# Patient Record
Sex: FEMALE | HISPANIC OR LATINO | Employment: UNEMPLOYED | URBAN - METROPOLITAN AREA
[De-identification: names, ages, dates, MRNs, and addresses within clinical notes are randomized per-mention and may not be internally consistent; named-entity substitution may affect disease eponyms.]

---

## 2020-08-28 ENCOUNTER — TRANSCRIBE ORDERS (OUTPATIENT)
Dept: CARDIOLOGY | Facility: CLINIC | Age: 70
End: 2020-08-28

## 2020-08-28 DIAGNOSIS — R06.09 DOE (DYSPNEA ON EXERTION): Primary | ICD-10-CM

## 2020-09-04 ENCOUNTER — HOSPITAL ENCOUNTER (OUTPATIENT)
Dept: CARDIOLOGY | Facility: HOSPITAL | Age: 70
Discharge: HOME | End: 2020-09-04
Admitting: INTERNAL MEDICINE

## 2020-09-04 VITALS — BODY MASS INDEX: 32 KG/M2 | HEIGHT: 60 IN | WEIGHT: 163 LBS

## 2020-09-04 DIAGNOSIS — R06.09 DOE (DYSPNEA ON EXERTION): ICD-10-CM

## 2020-09-04 PROCEDURE — 93018 CV STRESS TEST I&R ONLY: CPT | Performed by: INTERNAL MEDICINE

## 2020-09-04 PROCEDURE — 93016 CV STRESS TEST SUPVJ ONLY: CPT | Performed by: INTERNAL MEDICINE

## 2020-09-04 PROCEDURE — 78452 HT MUSCLE IMAGE SPECT MULT: CPT

## 2020-09-04 PROCEDURE — 0 TECHNETIUM TETROFOSMIN KIT: Performed by: INTERNAL MEDICINE

## 2020-09-04 PROCEDURE — A9502 TC99M TETROFOSMIN: HCPCS | Performed by: INTERNAL MEDICINE

## 2020-09-04 PROCEDURE — 93017 CV STRESS TEST TRACING ONLY: CPT

## 2020-09-04 PROCEDURE — 25010000002 REGADENOSON 0.4 MG/5ML SOLUTION: Performed by: INTERNAL MEDICINE

## 2020-09-04 PROCEDURE — 78452 HT MUSCLE IMAGE SPECT MULT: CPT | Performed by: INTERNAL MEDICINE

## 2020-09-04 RX ADMIN — TETROFOSMIN 1 DOSE: 1.38 INJECTION, POWDER, LYOPHILIZED, FOR SOLUTION INTRAVENOUS at 11:36

## 2020-09-04 RX ADMIN — REGADENOSON 0.4 MG: 0.08 INJECTION, SOLUTION INTRAVENOUS at 12:23

## 2020-09-04 RX ADMIN — TETROFOSMIN 1 DOSE: 1.38 INJECTION, POWDER, LYOPHILIZED, FOR SOLUTION INTRAVENOUS at 12:23

## 2020-09-08 LAB
BH CV NUCLEAR PRIOR STUDY: 2
BH CV STRESS BP STAGE 1: NORMAL
BH CV STRESS COMMENTS STAGE 1: NORMAL
BH CV STRESS DOSE REGADENOSON STAGE 1: 0.4
BH CV STRESS DURATION MIN STAGE 1: 0
BH CV STRESS DURATION SEC STAGE 1: 10
BH CV STRESS HR STAGE 1: 115
BH CV STRESS PROTOCOL 1: NORMAL
BH CV STRESS RECOVERY BP: NORMAL MMHG
BH CV STRESS RECOVERY HR: 98 BPM
BH CV STRESS STAGE 1: 1
LV EF NUC BP: 81 %
MAXIMAL PREDICTED HEART RATE: 150 BPM
PERCENT MAX PREDICTED HR: 76.67 %
STRESS BASELINE BP: NORMAL MMHG
STRESS BASELINE HR: 86 BPM
STRESS PERCENT HR: 90 %
STRESS POST EXERCISE DUR SEC: 10 SEC
STRESS POST PEAK BP: NORMAL MMHG
STRESS POST PEAK HR: 115 BPM
STRESS TARGET HR: 128 BPM

## 2023-11-17 ENCOUNTER — APPOINTMENT (OUTPATIENT)
Dept: CARDIOLOGY | Facility: HOSPITAL | Age: 73
End: 2023-11-17
Payer: MEDICAID

## 2023-11-17 ENCOUNTER — APPOINTMENT (OUTPATIENT)
Dept: CT IMAGING | Facility: HOSPITAL | Age: 73
End: 2023-11-17
Payer: MEDICAID

## 2023-11-17 ENCOUNTER — HOSPITAL ENCOUNTER (OUTPATIENT)
Facility: HOSPITAL | Age: 73
Setting detail: OBSERVATION
Discharge: HOME | End: 2023-11-17
Attending: EMERGENCY MEDICINE | Admitting: EMERGENCY MEDICINE
Payer: MEDICAID

## 2023-11-17 ENCOUNTER — APPOINTMENT (OUTPATIENT)
Dept: GENERAL RADIOLOGY | Facility: HOSPITAL | Age: 73
End: 2023-11-17
Payer: MEDICAID

## 2023-11-17 VITALS
HEART RATE: 76 BPM | HEIGHT: 59 IN | WEIGHT: 170 LBS | OXYGEN SATURATION: 95 % | BODY MASS INDEX: 34.27 KG/M2 | TEMPERATURE: 97.9 F | DIASTOLIC BLOOD PRESSURE: 83 MMHG | SYSTOLIC BLOOD PRESSURE: 150 MMHG | RESPIRATION RATE: 18 BRPM

## 2023-11-17 DIAGNOSIS — R79.89 TROPONIN LEVEL ELEVATED: ICD-10-CM

## 2023-11-17 DIAGNOSIS — R00.2 PALPITATIONS: Primary | ICD-10-CM

## 2023-11-17 LAB
ALBUMIN SERPL-MCNC: 4.5 G/DL (ref 3.5–5.2)
ALBUMIN/GLOB SERPL: 1.8 G/DL
ALP SERPL-CCNC: 156 U/L (ref 39–117)
ALT SERPL W P-5'-P-CCNC: 19 U/L (ref 1–33)
ANION GAP SERPL CALCULATED.3IONS-SCNC: 9 MMOL/L (ref 5–15)
APTT PPP: 27.1 SECONDS (ref 22.7–35.4)
ASCENDING AORTA: 2.9 CM
AST SERPL-CCNC: 19 U/L (ref 1–32)
BASOPHILS # BLD AUTO: 0.03 10*3/MM3 (ref 0–0.2)
BASOPHILS NFR BLD AUTO: 0.4 % (ref 0–1.5)
BH CV ECHO MEAS - AO MAX PG: 5 MMHG
BH CV ECHO MEAS - AO MEAN PG: 2.48 MMHG
BH CV ECHO MEAS - AO V2 MAX: 111.7 CM/SEC
BH CV ECHO MEAS - AO V2 VTI: 22.3 CM
BH CV ECHO MEAS - AVA(I,D): 2.12 CM2
BH CV ECHO MEAS - EDV(CUBED): 73.4 ML
BH CV ECHO MEAS - EDV(MOD-SP2): 47 ML
BH CV ECHO MEAS - EDV(MOD-SP4): 46 ML
BH CV ECHO MEAS - EF(MOD-BP): 55.6 %
BH CV ECHO MEAS - EF(MOD-SP2): 53.2 %
BH CV ECHO MEAS - EF(MOD-SP4): 58.7 %
BH CV ECHO MEAS - ESV(CUBED): 30.9 ML
BH CV ECHO MEAS - ESV(MOD-SP2): 22 ML
BH CV ECHO MEAS - ESV(MOD-SP4): 19 ML
BH CV ECHO MEAS - FS: 25 %
BH CV ECHO MEAS - IVS/LVPW: 0.86 CM
BH CV ECHO MEAS - IVSD: 0.98 CM
BH CV ECHO MEAS - LAT PEAK E' VEL: 4.9 CM/SEC
BH CV ECHO MEAS - LV DIASTOLIC VOL/BSA (35-75): 26.7 CM2
BH CV ECHO MEAS - LV MASS(C)D: 149.4 GRAMS
BH CV ECHO MEAS - LV MAX PG: 2.7 MMHG
BH CV ECHO MEAS - LV MEAN PG: 1.38 MMHG
BH CV ECHO MEAS - LV SYSTOLIC VOL/BSA (12-30): 11 CM2
BH CV ECHO MEAS - LV V1 MAX: 81.4 CM/SEC
BH CV ECHO MEAS - LV V1 VTI: 15.4 CM
BH CV ECHO MEAS - LVIDD: 4.2 CM
BH CV ECHO MEAS - LVIDS: 3.1 CM
BH CV ECHO MEAS - LVOT AREA: 3.1 CM2
BH CV ECHO MEAS - LVOT DIAM: 1.97 CM
BH CV ECHO MEAS - LVPWD: 1.15 CM
BH CV ECHO MEAS - MED PEAK E' VEL: 5.8 CM/SEC
BH CV ECHO MEAS - MV A DUR: 0.12 SEC
BH CV ECHO MEAS - MV A MAX VEL: 84.8 CM/SEC
BH CV ECHO MEAS - MV DEC SLOPE: 276.8 CM/SEC2
BH CV ECHO MEAS - MV DEC TIME: 0.17 SEC
BH CV ECHO MEAS - MV E MAX VEL: 36.4 CM/SEC
BH CV ECHO MEAS - MV E/A: 0.43
BH CV ECHO MEAS - MV MAX PG: 3.5 MMHG
BH CV ECHO MEAS - MV MEAN PG: 1.29 MMHG
BH CV ECHO MEAS - MV P1/2T: 43.8 MSEC
BH CV ECHO MEAS - MV V2 VTI: 15.6 CM
BH CV ECHO MEAS - MVA(P1/2T): 5 CM2
BH CV ECHO MEAS - MVA(VTI): 3 CM2
BH CV ECHO MEAS - PA ACC TIME: 0.12 SEC
BH CV ECHO MEAS - PA V2 MAX: 74.7 CM/SEC
BH CV ECHO MEAS - RAP SYSTOLE: 3 MMHG
BH CV ECHO MEAS - RV MAX PG: 1.3 MMHG
BH CV ECHO MEAS - RV V1 MAX: 57 CM/SEC
BH CV ECHO MEAS - RV V1 VTI: 10.7 CM
BH CV ECHO MEAS - SI(MOD-SP2): 14.5 ML/M2
BH CV ECHO MEAS - SI(MOD-SP4): 15.7 ML/M2
BH CV ECHO MEAS - SV(LVOT): 47.2 ML
BH CV ECHO MEAS - SV(MOD-SP2): 25 ML
BH CV ECHO MEAS - SV(MOD-SP4): 27 ML
BH CV ECHO MEAS - TAPSE (>1.6): 2.6 CM
BH CV ECHO MEASUREMENTS AVERAGE E/E' RATIO: 6.8
BH CV XLRA - RV BASE: 2.6 CM
BH CV XLRA - RV LENGTH: 6.7 CM
BH CV XLRA - RV MID: 1.8 CM
BH CV XLRA - TDI S': 13.9 CM/SEC
BILIRUB SERPL-MCNC: 0.2 MG/DL (ref 0–1.2)
BILIRUB UR QL STRIP: NEGATIVE
BUN SERPL-MCNC: 17 MG/DL (ref 8–23)
BUN/CREAT SERPL: 14.4 (ref 7–25)
CALCIUM SPEC-SCNC: 9.9 MG/DL (ref 8.6–10.5)
CHLORIDE SERPL-SCNC: 106 MMOL/L (ref 98–107)
CLARITY UR: CLEAR
CO2 SERPL-SCNC: 27 MMOL/L (ref 22–29)
COLOR UR: YELLOW
CREAT SERPL-MCNC: 1.18 MG/DL (ref 0.57–1)
D DIMER PPP FEU-MCNC: 0.82 MCGFEU/ML (ref 0–0.73)
DEPRECATED RDW RBC AUTO: 43.2 FL (ref 37–54)
EGFRCR SERPLBLD CKD-EPI 2021: 48.9 ML/MIN/1.73
EOSINOPHIL # BLD AUTO: 0.11 10*3/MM3 (ref 0–0.4)
EOSINOPHIL NFR BLD AUTO: 1.6 % (ref 0.3–6.2)
ERYTHROCYTE [DISTWIDTH] IN BLOOD BY AUTOMATED COUNT: 13.5 % (ref 12.3–15.4)
GEN 5 2HR TROPONIN T REFLEX: 17 NG/L
GLOBULIN UR ELPH-MCNC: 2.5 GM/DL
GLUCOSE SERPL-MCNC: 109 MG/DL (ref 65–99)
GLUCOSE UR STRIP-MCNC: NEGATIVE MG/DL
HCT VFR BLD AUTO: 44.3 % (ref 34–46.6)
HGB BLD-MCNC: 14.7 G/DL (ref 12–15.9)
HGB UR QL STRIP.AUTO: NEGATIVE
IMM GRANULOCYTES # BLD AUTO: 0.02 10*3/MM3 (ref 0–0.05)
IMM GRANULOCYTES NFR BLD AUTO: 0.3 % (ref 0–0.5)
INR PPP: 1 (ref 0.9–1.1)
KETONES UR QL STRIP: NEGATIVE
LEUKOCYTE ESTERASE UR QL STRIP.AUTO: NEGATIVE
LYMPHOCYTES # BLD AUTO: 1.94 10*3/MM3 (ref 0.7–3.1)
LYMPHOCYTES NFR BLD AUTO: 28 % (ref 19.6–45.3)
MAGNESIUM SERPL-MCNC: 2.2 MG/DL (ref 1.6–2.4)
MCH RBC QN AUTO: 28.8 PG (ref 26.6–33)
MCHC RBC AUTO-ENTMCNC: 33.2 G/DL (ref 31.5–35.7)
MCV RBC AUTO: 86.9 FL (ref 79–97)
MONOCYTES # BLD AUTO: 0.73 10*3/MM3 (ref 0.1–0.9)
MONOCYTES NFR BLD AUTO: 10.5 % (ref 5–12)
NEUTROPHILS NFR BLD AUTO: 4.09 10*3/MM3 (ref 1.7–7)
NEUTROPHILS NFR BLD AUTO: 59.2 % (ref 42.7–76)
NITRITE UR QL STRIP: NEGATIVE
NRBC BLD AUTO-RTO: 0 /100 WBC (ref 0–0.2)
NT-PROBNP SERPL-MCNC: 42.5 PG/ML (ref 0–900)
PH UR STRIP.AUTO: 7.5 [PH] (ref 5–8)
PLATELET # BLD AUTO: 192 10*3/MM3 (ref 140–450)
PMV BLD AUTO: 9.9 FL (ref 6–12)
POTASSIUM SERPL-SCNC: 4 MMOL/L (ref 3.5–5.2)
PROT SERPL-MCNC: 7 G/DL (ref 6–8.5)
PROT UR QL STRIP: NEGATIVE
PROTHROMBIN TIME: 13.3 SECONDS (ref 11.7–14.2)
QT INTERVAL: 374 MS
QTC INTERVAL: 458 MS
RBC # BLD AUTO: 5.1 10*6/MM3 (ref 3.77–5.28)
SINUS: 3 CM
SODIUM SERPL-SCNC: 142 MMOL/L (ref 136–145)
SP GR UR STRIP: 1.01 (ref 1–1.03)
STJ: 2.34 CM
T3FREE SERPL-MCNC: 3.1 PG/ML (ref 2–4.4)
T4 FREE SERPL-MCNC: 0.98 NG/DL (ref 0.93–1.7)
TROPONIN T DELTA: 6 NG/L
TROPONIN T SERPL HS-MCNC: 11 NG/L
TSH SERPL DL<=0.05 MIU/L-ACNC: 4.38 UIU/ML (ref 0.27–4.2)
UROBILINOGEN UR QL STRIP: NORMAL
WBC NRBC COR # BLD AUTO: 6.92 10*3/MM3 (ref 3.4–10.8)

## 2023-11-17 PROCEDURE — 85379 FIBRIN DEGRADATION QUANT: CPT | Performed by: EMERGENCY MEDICINE

## 2023-11-17 PROCEDURE — 25510000001 IOPAMIDOL PER 1 ML: Performed by: EMERGENCY MEDICINE

## 2023-11-17 PROCEDURE — 93306 TTE W/DOPPLER COMPLETE: CPT

## 2023-11-17 PROCEDURE — 83880 ASSAY OF NATRIURETIC PEPTIDE: CPT | Performed by: NURSE PRACTITIONER

## 2023-11-17 PROCEDURE — 84439 ASSAY OF FREE THYROXINE: CPT | Performed by: NURSE PRACTITIONER

## 2023-11-17 PROCEDURE — G0378 HOSPITAL OBSERVATION PER HR: HCPCS

## 2023-11-17 PROCEDURE — 93306 TTE W/DOPPLER COMPLETE: CPT | Performed by: INTERNAL MEDICINE

## 2023-11-17 PROCEDURE — 85025 COMPLETE CBC W/AUTO DIFF WBC: CPT | Performed by: EMERGENCY MEDICINE

## 2023-11-17 PROCEDURE — 93005 ELECTROCARDIOGRAM TRACING: CPT | Performed by: EMERGENCY MEDICINE

## 2023-11-17 PROCEDURE — 96360 HYDRATION IV INFUSION INIT: CPT

## 2023-11-17 PROCEDURE — 81003 URINALYSIS AUTO W/O SCOPE: CPT | Performed by: EMERGENCY MEDICINE

## 2023-11-17 PROCEDURE — 36415 COLL VENOUS BLD VENIPUNCTURE: CPT

## 2023-11-17 PROCEDURE — 99285 EMERGENCY DEPT VISIT HI MDM: CPT

## 2023-11-17 PROCEDURE — 93242 EXT ECG>48HR<7D RECORDING: CPT

## 2023-11-17 PROCEDURE — 99253 IP/OBS CNSLTJ NEW/EST LOW 45: CPT | Performed by: INTERNAL MEDICINE

## 2023-11-17 PROCEDURE — 80053 COMPREHEN METABOLIC PANEL: CPT | Performed by: EMERGENCY MEDICINE

## 2023-11-17 PROCEDURE — 93010 ELECTROCARDIOGRAM REPORT: CPT | Performed by: INTERNAL MEDICINE

## 2023-11-17 PROCEDURE — 84443 ASSAY THYROID STIM HORMONE: CPT | Performed by: NURSE PRACTITIONER

## 2023-11-17 PROCEDURE — 85730 THROMBOPLASTIN TIME PARTIAL: CPT | Performed by: EMERGENCY MEDICINE

## 2023-11-17 PROCEDURE — 84484 ASSAY OF TROPONIN QUANT: CPT | Performed by: EMERGENCY MEDICINE

## 2023-11-17 PROCEDURE — 25810000003 SODIUM CHLORIDE 0.9 % SOLUTION: Performed by: NURSE PRACTITIONER

## 2023-11-17 PROCEDURE — 84481 FREE ASSAY (FT-3): CPT | Performed by: NURSE PRACTITIONER

## 2023-11-17 PROCEDURE — 83735 ASSAY OF MAGNESIUM: CPT | Performed by: EMERGENCY MEDICINE

## 2023-11-17 PROCEDURE — 85610 PROTHROMBIN TIME: CPT | Performed by: EMERGENCY MEDICINE

## 2023-11-17 PROCEDURE — 71045 X-RAY EXAM CHEST 1 VIEW: CPT

## 2023-11-17 PROCEDURE — 71275 CT ANGIOGRAPHY CHEST: CPT

## 2023-11-17 PROCEDURE — 25810000003 SODIUM CHLORIDE 0.9 % SOLUTION: Performed by: EMERGENCY MEDICINE

## 2023-11-17 RX ORDER — LINACLOTIDE 145 UG/1
1 CAPSULE, GELATIN COATED ORAL DAILY
COMMUNITY
Start: 2023-09-04

## 2023-11-17 RX ORDER — ASPIRIN 81 MG/1
324 TABLET, CHEWABLE ORAL ONCE
Status: DISCONTINUED | OUTPATIENT
Start: 2023-11-17 | End: 2023-11-17 | Stop reason: HOSPADM

## 2023-11-17 RX ORDER — DONEPEZIL HYDROCHLORIDE 10 MG/1
10 TABLET, FILM COATED ORAL NIGHTLY
COMMUNITY

## 2023-11-17 RX ORDER — SODIUM CHLORIDE 0.9 % (FLUSH) 0.9 %
10 SYRINGE (ML) INJECTION AS NEEDED
Status: DISCONTINUED | OUTPATIENT
Start: 2023-11-17 | End: 2023-11-17 | Stop reason: HOSPADM

## 2023-11-17 RX ORDER — SODIUM CHLORIDE 0.9 % (FLUSH) 0.9 %
10 SYRINGE (ML) INJECTION EVERY 12 HOURS SCHEDULED
Status: DISCONTINUED | OUTPATIENT
Start: 2023-11-17 | End: 2023-11-17 | Stop reason: HOSPADM

## 2023-11-17 RX ORDER — SODIUM CHLORIDE 9 MG/ML
40 INJECTION, SOLUTION INTRAVENOUS AS NEEDED
Status: DISCONTINUED | OUTPATIENT
Start: 2023-11-17 | End: 2023-11-17 | Stop reason: HOSPADM

## 2023-11-17 RX ADMIN — Medication 10 ML: at 08:10

## 2023-11-17 RX ADMIN — SODIUM CHLORIDE 500 ML: 9 INJECTION, SOLUTION INTRAVENOUS at 05:31

## 2023-11-17 RX ADMIN — SODIUM CHLORIDE 500 ML: 9 INJECTION, SOLUTION INTRAVENOUS at 08:22

## 2023-11-17 RX ADMIN — IOPAMIDOL 69 ML: 755 INJECTION, SOLUTION INTRAVENOUS at 06:29

## 2023-11-17 NOTE — PLAN OF CARE
Goal Outcome Evaluation:      Discharge    Patient  used for discharge. Pt has all belongings and voiced understanding of discharge, follow up and ziopatch.

## 2023-11-17 NOTE — ED TRIAGE NOTES
"Pt arrived via ambulance from home stating she woke up needing to urinate and felt her heart racing with a \"pressure-like\" feeling in her chest. Pt states she has had increased urinary frequency for the last few nights as well.   "

## 2023-11-17 NOTE — PROGRESS NOTES
Clinical Pharmacy Services: Medication History    Kassy Ventura is a 73 y.o. female presenting to [facility] for   Chief Complaint   Patient presents with    Urinary Frequency    Rapid Heart Rate       She  has a past medical history of Hypertension.    Allergies as of 11/17/2023 - Reviewed 11/17/2023   Allergen Reaction Noted    Penicillins Rash 12/18/2020       Medication information was obtained from: Patient   Pharmacy and Phone Number:     Prior to Admission Medications       Prescriptions Last Dose Informant Patient Reported? Taking?    fluticasone (FLONASE) 50 MCG/ACT nasal spray  Self Yes Yes    2 sprays into the nostril(s) as directed by provider Daily.    Linzess 145 MCG capsule capsule   Yes Yes    Take 1 capsule by mouth Daily.    omeprazole (priLOSEC) 20 MG capsule  Pharmacy Yes Yes    Take 1 capsule by mouth every night at bedtime.    oxybutynin XL (DITROPAN-XL) 5 MG 24 hr tablet  Pharmacy Yes Yes    Take 1 tablet by mouth Daily.    SUMAtriptan (IMITREX) 25 MG tablet  Pharmacy Yes Yes    Take 1 tablet by mouth 1 (One) Time As Needed.    traZODone (DESYREL) 50 MG tablet  Pharmacy Yes Yes    Take 1 tablet by mouth At Night As Needed for Sleep.    verapamil (CALAN) 80 MG tablet  Pharmacy Yes Yes    Take 1 tablet by mouth Every 12 (Twelve) Hours.    donepezil (ARICEPT) 10 MG tablet Not Taking Pharmacy Yes No    Take 1 tablet by mouth Every Night.    Patient not taking:  Reported on 11/17/2023    meclizine (ANTIVERT) 25 MG tablet   Yes No    Take 1 tablet by mouth.    methocarbamol (ROBAXIN) 750 MG tablet   Yes No    Take 1 tablet by mouth 3 times a day.    montelukast (SINGULAIR) 10 MG tablet   Yes No    Take 1 tablet by mouth every night at bedtime.    mupirocin (BACTROBAN) 2 % ointment   No No    Apply 1 application  topically to the appropriate area as directed 3 (Three) Times a Day.              Medication notes: Surescripts show the patient was dispensed a prescription for donepezil  on 8/21/23 for a 90 day supply. Patient states she is no longer taking it.     This medication list is complete to the best of my knowledge as of 11/17/2023    Please call if questions.    Dusty Weaver  Medication History Technician  [phone]    11/17/2023 08:37 EST

## 2023-11-17 NOTE — H&P
[facility]   HISTORY AND PHYSICAL    Patient Name: Kassy Ventura  : 1950  MRN: [id number]  Primary Care Physician:  Laura Mckeon APRN  Date of admission: 2023    Subjective   Subjective     Chief Complaint:   Chief Complaint   Patient presents with    Urinary Frequency    Rapid Heart Rate         HPI:    Kassy Ventura is a pleasant afebrile 73 y.o.  female with a past medical history of hypertension.    She presents to the emergency department at [facility] today with complaint of palpitations and chest pain.  She has been admitted to the ED observation unit for further testing and evaluation.    Patient states she developed palpitations earlier today that was associated with some chest discomfort.  Her high-sensitivity troponin trended from -.  She had a positive D-dimer and a CTA chest was obtained in the emergency department which did not reveal any acute pulmonary thromboembolism.  She denies any shortness of breath, orthopnea or peripheral edema.    She was seen and evaluate by Dr. Ashford with cardiology service who has reviewed her echocardiogram and noted that she has a normal ejection fraction with no significant valvular disease.  Given that she has not had any arrhythmias today while in the observation unit has a reassuring EKG he recommends 7-day Holter monitoring and follow-up in the office in 6 weeks.     tablet Utilized      Review of Systems   All systems were reviewed and negative except for: what is mentioned above    Personal History     Past Medical History:   Diagnosis Date    Hypertension        History reviewed. No pertinent surgical history.    Family History: family history includes Cancer in her father; Heart failure in her mother; No Known Problems in her brother, cousin, daughter, maternal grandfather, maternal grandmother, paternal grandfather, paternal grandmother, sister, son, and another family member. Otherwise  pertinent FHx was reviewed and not pertinent to current issue.    Social History:  reports that she has quit smoking. Her smoking use included cigarettes. She has never used smokeless tobacco. She reports that she does not drink alcohol and does not use drugs.    Home Medications:  SUMAtriptan, donepezil, fluticasone, linaclotide, meclizine, methocarbamol, montelukast, mupirocin, omeprazole, oxybutynin XL, traZODone, and verapamil    Allergies:  Allergies   Allergen Reactions    Penicillins Rash       Objective   Objective     Vitals:   Temp:  [97.9 °F (36.6 °C)-98.2 °F (36.8 °C)] 97.9 °F (36.6 °C)  Heart Rate:  [] 76  Resp:  [18] 18  BP: (136-179)/(82-89) 150/83  Flow (L/min):  [3] 3  Physical Exam    Constitutional: Awake, alert   Eyes: PERRLA, sclerae anicteric, no conjunctival injection   HENT: NCAT, mucous membranes moist   Neck: Supple, no thyromegaly, no lymphadenopathy, trachea midline   Respiratory: Clear to auscultation bilaterally, nonlabored respirations    Cardiovascular: RRR, no murmurs, rubs, or gallops, palpable pedal pulses bilaterally   Gastrointestinal: Positive bowel sounds, soft, nontender, obese   Musculoskeletal: No bilateral ankle edema, no clubbing or cyanosis to extremities   Psychiatric: Appropriate affect, cooperative   Neurologic: Oriented x 3, strength symmetric in all extremities, Cranial Nerves grossly intact to confrontation, speech clear   Skin: No rashes     Result Review    Result Review:  I have personally reviewed the results from the time of this admission to 11/17/2023 10:10 EST and agree with these findings:  [x]  Laboratory list / accordion  []  Microbiology  [x]  Radiology  [x]  EKG/Telemetry   []  Cardiology/Vascular   []  Pathology  []  Old records  []  Other:  Most notable findings include: HS troponin 11, 17. D dimer 0.82, urinalysis unremarkable, cta chest negative for PE, echocardiogram shows EF 56-60%      Assessment & Plan   Assessment / Plan     Brief Patient  Summary:  Kassy Ventura is a 73 y.o. female who is being evaluated for chest pain and palpitations    Active Hospital Problems:  Active Hospital Problems    Diagnosis     **Palpitations      Plan:     Palpitations  Consult to cardiology, clear for discharge home on holter monitoring  Telemetry has not revealed any arrhythmias  High sensitivity troponin 11, 17  Echocardiogram shows EF 56-60%, no significant valvular anomalies    Hypertension  Vitals every 4 hours  Continue home medications    DVT prophylaxis:  Mechanical DVT prophylaxis orders are present.    CODE STATUS:    Level Of Support Discussed With: Patient  Code Status (Patient has no pulse and is not breathing): CPR (Attempt to Resuscitate)  Medical Interventions (Patient has pulse or is breathing): Full Support    Admission Status:  I believe this patient meets observation status.    This note also serves as a discharge summary.     Electronically signed by FRANK Rodriguez, 11/17/23, 10:10 AM EST.    85 minutes has been spent by [facility] providers in the care of this patient while under observation status      I have worn appropriate PPE during this patient encounter, sanitized my hands both with entering and exiting patient's room.    I have discussed plan of care with patient including advance care plan and/or surrogate decision maker.  Patient advises that their daughter Cecilia will be their primary surrogate decision maker

## 2023-11-17 NOTE — PROGRESS NOTES
MD ATTESTATION NOTE    The MARIO and I have discussed this patient's history, physical exam, and treatment plan.  I have reviewed the documentation and personally had a face to face interaction with the patient. I affirm the documentation and agree with the treatment and plan.  The attached note describes my personal findings.      I provided a substantive portion of the care of the patient.  I personally performed the physical exam in its entirety, and below are my findings.        Brief HPI: This patient is a 73-year-old femal admitted to the observation unit today for further work-up of heart palpitations with associated shortness of breath.  She does report that the shortness of breath as well as chest discomfort are very minimal but her more concerned is the heart palpitations.  She describes the palpitation as more of a rapid heartbeat with exertion.  Currently, she states that her symptoms have resolved and she is without acute complaint.      PHYSICAL EXAM  ED Triage Vitals   Temp Heart Rate Resp BP SpO2   11/17/23 0341 11/17/23 0341 11/17/23 0341 11/17/23 0341 11/17/23 0341   98.2 °F (36.8 °C) 102 18 179/87 96 %      Temp src Heart Rate Source Patient Position BP Location FiO2 (%)   11/17/23 0341 11/17/23 0657 11/17/23 0657 11/17/23 0657 --   Oral Monitor Lying Right arm          GENERAL: Resting comfortably and in no acute distress, nontoxic in appearance  HENT: nares patent  EYES: no scleral icterus  CV: regular rhythm, normal rate, no M/R/G  RESPIRATORY: normal effort, lungs clear bilaterally  ABDOMEN: soft, nontender, no rebound or guarding  MUSCULOSKELETAL: no deformity, no edema  NEURO: alert, moves all extremities, follows commands  PSYCH:  calm, cooperative  SKIN: warm, dry    Vital signs and nursing notes reviewed.        Plan: The patient's EKG is normal sinus and nonischemic but serial cardiac enzymes have trended upward slightly from 11 to 17.  We will obtain an echocardiogram and ask cardiology  to see in morning consultation.  We will monitor and reassess following.

## 2023-11-17 NOTE — CONSULTS
Date of Hospital Visit: 2023  Encounter Provider: Caden Ashford MD  Place of Service: [facility] CARDIOLOGY  Patient Name: Kassy Ventura  :1950  Referral Provider: Shayan Andrew MD    Chief complaint palpitations and urinary frequency     History of Present Illness Kassy Ventura is a 73 year old pt with a history of HTN.     Pt had a normal stress test in .     Pt presented to ER on 23 with complaints of  acute urinary frequency and palpitations. Pt reports frequent palpitations over the last week. Pt reported minimal shortness of breath associated with the episodes.  They occur most frequently at night.  She recently received information that her sister has cancer in [place].  She states that 10 to 15 years ago while still in [place] she was told she had cardiomyopathy but had further cardiac work-up in the  states that showed her heart function was normal.  She denies any significant cardiac testing at time of diagnosis.      In ER, BUN/CR 17/1.18, alk phos 156, troponin T 11, D dimer 0.82, UA negative for nitrates, CTA of chest showed no PE,  The thoracic aorta shows no evidence of aneurysm or dissection.  2. There is some minimal atelectasis at the lung bases, right greater than left. The lungs are otherwise clear. There is no mediastinal or hilar or axillary adenopathy. There is no pericardial effusion. CXR negative, EKG showed SR 90. Pt admitted for palpitations.     There are no arrhythmias on telemetry.  She states she is having palpitations during interview but I hear no ectopy on examination.  She is euvolemic.  No orthopnea, PND or edema.  No dizziness or syncope.    HPI was reviewed, updated and amended when necessary.    Stress test 20  Myocardial perfusion imaging indicates a normal myocardial perfusion study with no evidence of ischemia.  Left ventricular ejection fraction is hyperdynamic (Calculated EF > 70%).  Impressions are  consistent with a low risk study.       Past Medical History:   Diagnosis Date    Hypertension        No past surgical history on file.    (Not in a hospital admission)      Current Meds  Scheduled Meds:aspirin, 324 mg, Oral, Once  sodium chloride, 500 mL, Intravenous, Once  sodium chloride, 10 mL, Intravenous, Q12H      Continuous Infusions:   PRN Meds:.  Magnesium Cardiology Dose Replacement - Follow Nurse / BPA Driven Protocol    Potassium Replacement - Follow Nurse / BPA Driven Protocol    [COMPLETED] Insert Peripheral IV **AND** sodium chloride    sodium chloride    sodium chloride    Allergies as of 11/17/2023 - Reviewed 11/17/2023   Allergen Reaction Noted    Penicillins Rash 12/18/2020       Social History     Socioeconomic History    Marital status: Single   Tobacco Use    Smoking status: Former     Types: Cigarettes    Smokeless tobacco: Never   Vaping Use    Vaping Use: Never used   Substance and Sexual Activity    Alcohol use: Never    Drug use: Never    Sexual activity: Defer       Family History   Problem Relation Age of Onset    Heart failure Mother     Cancer Father     No Known Problems Sister     No Known Problems Brother     No Known Problems Son     No Known Problems Daughter     No Known Problems Maternal Grandmother     No Known Problems Maternal Grandfather     No Known Problems Paternal Grandmother     No Known Problems Paternal Grandfather     No Known Problems Cousin     No Known Problems Other      Past surgical, medical, social and family history was reviewed, updated and amended when necessary.    Review of Systems   Constitutional: Negative for chills and fever.   HENT:  Negative for hoarse voice and sore throat.    Eyes:  Negative for double vision and photophobia.   Cardiovascular:  Positive for palpitations. Negative for chest pain, leg swelling, near-syncope, orthopnea, paroxysmal nocturnal dyspnea and syncope.   Respiratory:  Negative for cough and wheezing.    Skin:  Negative for  "poor wound healing and rash.   Musculoskeletal:  Negative for arthritis and joint swelling.   Gastrointestinal:  Negative for bloating, abdominal pain, hematemesis and hematochezia.   Neurological:  Negative for dizziness and focal weakness.   Psychiatric/Behavioral:  Negative for depression and suicidal ideas.             Objective:   Temp:  [98.2 °F (36.8 °C)] 98.2 °F (36.8 °C)  Heart Rate:  [] 89  Resp:  [18] 18  BP: (136-179)/(87-89) 153/89  Body mass index is 34.34 kg/m².  Flowsheet Rows      Flowsheet Row First Filed Value   Admission Height 149.9 cm (59\") Documented at 11/17/2023 0417   Admission Weight 77.1 kg (170 lb) Documented at 11/17/2023 0417          Vitals:    11/17/23 0657   BP: 153/89   Pulse: 89   Resp: 18   Temp:    SpO2: 93%       Vitals reviewed.   Constitutional:       Appearance: Healthy appearance. Not in distress.   Neck:      Vascular: No JVR. JVD normal.   Pulmonary:      Effort: Pulmonary effort is normal.      Breath sounds: Normal breath sounds. No wheezing. No rhonchi. No rales.   Chest:      Chest wall: Not tender to palpatation.   Cardiovascular:      PMI at left midclavicular line. Normal rate. Regular rhythm. Normal S1. Normal S2.       Murmurs: There is no murmur.      No gallop.  No click. No rub.   Pulses:     Intact distal pulses.   Edema:     Peripheral edema absent.   Abdominal:      General: Bowel sounds are normal.      Palpations: Abdomen is soft.      Tenderness: There is no abdominal tenderness.   Musculoskeletal: Normal range of motion.         General: No tenderness. Skin:     General: Skin is warm and dry.   Neurological:      General: No focal deficit present.      Mental Status: Alert and oriented to person, place and time.                 Lab Review:      Results from last 7 days   Lab Units 11/17/23  0436   SODIUM mmol/L 142   POTASSIUM mmol/L 4.0   CHLORIDE mmol/L 106   CO2 mmol/L 27.0   BUN mg/dL 17   CREATININE mg/dL 1.18*   CALCIUM mg/dL 9.9   BILIRUBIN " mg/dL 0.2   ALK PHOS U/L 156*   ALT (SGPT) U/L 19   AST (SGOT) U/L 19   GLUCOSE mg/dL 109*     Results from last 7 days   Lab Units 11/17/23  0655 11/17/23 0436   HSTROP T ng/L 17* 11     @LABRCNTbnp@  Results from last 7 days   Lab Units 11/17/23  0436   WBC 10*3/mm3 6.92   HEMOGLOBIN g/dL 14.7   HEMATOCRIT % 44.3   PLATELETS 10*3/mm3 192     Results from last 7 days   Lab Units 11/17/23  0436   INR  1.00   APTT seconds 27.1     Results from last 7 days   Lab Units 11/17/23 0436   MAGNESIUM mg/dL 2.2     @LABRCNTIP(chol,trig,hdl,ldl)              I personally viewed and interpreted the patient's EKG/Telemetry data    Palpitations    Assessment and Plan:    Palpitations -normal EF and no significant valvular heart disease on echocardiogram.  No arrhythmias on telemetry.  Normal EKG.  We will send the patient home with a 7-day Zio patch and have her follow-up in clinic in 6 weeks.    Caden Ashford MD  11/17/23  08:41 EST.  Time spent in reviewing chart, discussion and examination:

## 2023-11-17 NOTE — ED PROVIDER NOTES
EMERGENCY DEPARTMENT ENCOUNTER    Room Number:  107/1  PCP: Laura Mckeon APRN  Patient Care Team:  Laura Mckeon APRN as PCP - General (Nurse Practitioner)   Independent Historians: Patient    HPI:  Chief Complaint: Palpitations, urinary frequency    A complete HPI/ROS/PMH/PSH/SH/FH are unobtainable due to: None    Chronic or social conditions impacting patient care (Social Determinants of Health): None  (Financial Resource Strain / Food Insecurity / Transportation Needs / Physical Activity / Stress / Social Connections / Intimate Partner Violence / Housing Stability)    Context: Kassy Ventura is a 73 y.o. female who presents to the ED c/o acute urinary.  C.  States that she has bilateral getting up at night to use the restroom which is unusual for her.  States that she had an episode of palpitations this morning.  Feeling her heart was beating fast.  Reports some very minimal shortness of breath and discomfort with it.  States this happened around 2 or 3 in the morning.  Patient states that she now feels normal.  Symptoms are completely resolved.    Review of prior external notes (non-ED) -and- Review of prior external test results outside of this encounter: Reviewed patient's primary care note from 12/18/2020    Prescription drug monitoring program review:         PAST MEDICAL HISTORY  Active Ambulatory Problems     Diagnosis Date Noted    No Active Ambulatory Problems     Resolved Ambulatory Problems     Diagnosis Date Noted    No Resolved Ambulatory Problems     Past Medical History:   Diagnosis Date    Hypertension          PAST SURGICAL HISTORY  History reviewed. No pertinent surgical history.      FAMILY HISTORY  Family History   Problem Relation Age of Onset    Heart failure Mother     Cancer Father     No Known Problems Sister     No Known Problems Brother     No Known Problems Son     No Known Problems Daughter     No Known Problems Maternal Grandmother     No Known Problems Maternal  Grandfather     No Known Problems Paternal Grandmother     No Known Problems Paternal Grandfather     No Known Problems Cousin     No Known Problems Other          SOCIAL HISTORY  Social History     Socioeconomic History    Marital status: Single   Tobacco Use    Smoking status: Former     Types: Cigarettes    Smokeless tobacco: Never   Vaping Use    Vaping Use: Never used   Substance and Sexual Activity    Alcohol use: Never    Drug use: Never    Sexual activity: Defer         ALLERGIES  Penicillins        REVIEW OF SYSTEMS  Review of Systems  Included in HPI  All systems reviewed and negative except for those discussed in HPI.      PHYSICAL EXAM    I have reviewed the triage vital signs and nursing notes.    ED Triage Vitals   Temp Heart Rate Resp BP SpO2   11/17/23 0341 11/17/23 0341 11/17/23 0341 11/17/23 0341 11/17/23 0341   98.2 °F (36.8 °C) 102 18 179/87 96 %      Temp src Heart Rate Source Patient Position BP Location FiO2 (%)   11/17/23 0341 11/17/23 0657 11/17/23 0657 11/17/23 0657 --   Oral Monitor Lying Right arm        Physical Exam  GENERAL: alert, no acute distress  SKIN: Warm, dry  HENT: Normocephalic, atraumatic  EYES: no scleral icterus  CV: regular rhythm, regular rate  RESPIRATORY: normal effort, lungs clear  ABDOMEN: soft, nontender, nondistended  MUSCULOSKELETAL: no deformity  NEURO: alert, moves all extremities, follows commands                                                               LAB RESULTS  Recent Results (from the past 24 hour(s))   Adult Transthoracic Echo Complete With Contrast if Necessary Per Protocol    Collection Time: 11/17/23 11:53 AM   Result Value Ref Range    EF(MOD-bp) 55.6 %    LVIDd 4.2 cm    LVIDs 3.1 cm    IVSd 0.98 cm    LVPWd 1.15 cm    FS 25.0 %    IVS/LVPW 0.86 cm    ESV(cubed) 30.9 ml    LV Sys Vol (BSA corrected) 11.0 cm2    EDV(cubed) 73.4 ml    LV Galvin Vol (BSA corrected) 26.7 cm2    LV mass(C)d 149.4 grams    LVOT area 3.1 cm2    LVOT diam 1.97 cm     EDV(MOD-sp2) 47.0 ml    EDV(MOD-sp4) 46.0 ml    ESV(MOD-sp2) 22.0 ml    ESV(MOD-sp4) 19.0 ml    SV(MOD-sp2) 25.0 ml    SV(MOD-sp4) 27.0 ml    SI(MOD-sp2) 14.5 ml/m2    SI(MOD-sp4) 15.7 ml/m2    EF(MOD-sp2) 53.2 %    EF(MOD-sp4) 58.7 %    MV E max anup 36.4 cm/sec    MV A max anup 84.8 cm/sec    MV dec time 0.17 sec    MV E/A 0.43     MV A dur 0.12 sec    Med Peak E' Anup 5.8 cm/sec    Lat Peak E' Anup 4.9 cm/sec    Avg E/e' ratio 6.80     SV(LVOT) 47.2 ml    RV Base 2.6 cm    RV Mid 1.80 cm    RV Length 6.7 cm    RV S' 13.9 cm/sec    LV V1 max 81.4 cm/sec    LV V1 max PG 2.7 mmHg    LV V1 mean PG 1.38 mmHg    LV V1 VTI 15.4 cm    Ao pk anup 111.7 cm/sec    Ao max PG 5.0 mmHg    Ao mean PG 2.48 mmHg    Ao V2 VTI 22.3 cm    VANGIE(I,D) 2.12 cm2    MV max PG 3.5 mmHg    MV mean PG 1.29 mmHg    MV V2 VTI 15.6 cm    MV P1/2t 43.8 msec    MVA(P1/2t) 5.0 cm2    MVA(VTI) 3.0 cm2    MV dec slope 276.8 cm/sec2    RAP systole 3.0 mmHg    RV V1 max PG 1.30 mmHg    RV V1 max 57.0 cm/sec    RV V1 VTI 10.7 cm    PA V2 max 74.7 cm/sec    PA acc time 0.12 sec    Sinus 3.0 cm    STJ 2.34 cm    TAPSE (>1.6) 2.60 cm    Ascending aorta 2.9 cm       I ordered the above labs and independently reviewed the results.        RADIOLOGY  Adult Transthoracic Echo Complete With Contrast if Necessary Per Protocol    Result Date: 11/17/2023    Left ventricular systolic function is normal. Left ventricular ejection fraction appears to be 56 - 60%.   Left ventricular diastolic function was normal.      I ordered the above noted radiological studies. Reviewed by me and discussed with radiologist.  See dictation for official radiology interpretation.      PROCEDURES    Procedures      MEDICATIONS GIVEN IN ER    Medications   sodium chloride 0.9 % bolus 500 mL (0 mL Intravenous Stopped 11/17/23 3485)   iopamidol (ISOVUE-370) 76 % injection 100 mL (69 mL Intravenous Given by Other 11/17/23 2671)   sodium chloride 0.9 % bolus 500 mL (0 mL Intravenous Stopped  11/17/23 1613)         ORDERS PLACED DURING THIS VISIT:  Orders Placed This Encounter   Procedures    XR Chest 1 View    CT Angiogram Chest    Comprehensive Metabolic Panel    Protime-INR    aPTT    Urinalysis With Microscopic If Indicated (No Culture) - Urine, Clean Catch    Magnesium    CBC Auto Differential    High Sensitivity Troponin T    D-dimer, Quantitative    High Sensitivity Troponin T 2Hr    TSH    T3, Free    T4, Free    BNP    Monitor Blood Pressure    Vital Signs Per Hospital Policy    Pulse Oximetry, Continuous    Weigh Patient    Activity As Tolerated    Saline Lock & Maintain IV Access    Place Sequential Compression Device    Inpatient Cardiology Consult    Holter Monitor - 72 Hour Up To 15 Days    ECG 12 Lead Tachycardia    SCANNED - TELEMETRY      SCANNED - TELEMETRY      Adult Transthoracic Echo Complete With Contrast if Necessary Per Protocol    Initiate ED Observation Status    Discharge patient    CBC & Differential         PROGRESS, DATA ANALYSIS, CONSULTS, AND MEDICAL DECISION MAKING    All labs have been independently interpreted by me.  All radiology studies have been reviewed by me and discussed with radiologist dictating the report.   EKG's independently viewed and interpreted by me.  Discussion below represents my analysis of pertinent findings related to patient's condition, differential diagnosis, treatment plan and final disposition.    My differential diagnosis for palpitations includes but is not limited to    Arrhythmias  Atrial fibrillation/flutter  Bradycardia caused by advanced arteriovenous  block or sinus node dysfunction  Bradycardia-tachycardia syndrome (sick sinus syndrome)  Multifocal atrial tachycardia  Premature supraventricular or ventricular contractions  Sinus tachycardia or arrhythmia  Supraventricular tachycardia  Ventricular tachycardia  Brianna-Parkinson-White syndrome     Psychiatric causes  Anxiety disorder  Panic attacks  Drugs and  medications  Alcohol  Caffeine  Certain prescription and over-the-counter agents (e.g., digitalis, phenothiazine, theophylline, beta agonists)  Street drugs (e.g., cocaine)  Tobacco    Nonarrhythmic cardiac causes  Atrial or ventricular septal defect  Cardiomyopathy  Congenital heart disease  Congestive heart failure  Mitral valve prolapse  Pacemaker-mediated tachycardia  Pericarditis  Valvular disease (e.g., aortic insufficiency, stenosis)    Extracardiac causes  Anemia  Electrolyte imbalance  Fever  Hyperthyroidism  Hypoglycemia  Hypovolemia  Pheochromocytoma  Pulmonary disease  Vasovagal syndrome         Clinical Scores:              ED Course as of 11/18/23 0731   Fri Nov 17, 2023   0425 EKG          EKG time: 0418  Rhythm/Rate: Sinus rhythm rate 90  P waves and MD: Normal  QRS, axis: Narrow regular  ST and T waves: Within normal limits    Interpreted Contemporaneously by me, independently viewed [TJ]   0801 The patient's troponin did trend up slightly.  I have spoken with the patient and she is agreeable to stay for further evaluation.  She has previously seen Dr. Christiansen.  I have spoken with ZEN Mariscal who is agreeable to accept the patient to the observation unit.  Aspirin has been ordered.  Patient currently denies any chest discomfort. [RS]      ED Course User Index  [RS] Fidel Sol MD  [TJ] Ernesto Burrell MD             PPE: The patient wore a mask and I wore an N95 mask throughout the entire patient encounter.       AS OF 07:31 EST VITALS:    BP - 150/83  HR - 76  TEMP - 97.9 °F (36.6 °C) (Oral)  O2 SATS - 95%        DIAGNOSIS  Final diagnoses:   Palpitations   Troponin level elevated         DISPOSITION  ED Disposition       ED Disposition   Decision to Admit    Condition   --    Comment   --                  Note Disclaimer: At [facility], we believe that sharing information builds trust and better relationships. You are receiving this note because you recently visited [facility]. It  is possible you will see health information before a provider has talked with you about it. This kind of information can be easy to misunderstand. To help you fully understand what it means for your health, we urge you to discuss this note with your provider.         Ernesto Burrell MD  11/17/23 0718       Ernesto Burrell MD  11/18/23 0731

## 2023-12-20 ENCOUNTER — OFFICE VISIT (OUTPATIENT)
Age: 73
End: 2023-12-20
Payer: MEDICAID

## 2023-12-20 VITALS
SYSTOLIC BLOOD PRESSURE: 140 MMHG | HEIGHT: 59 IN | OXYGEN SATURATION: 98 % | WEIGHT: 170.6 LBS | BODY MASS INDEX: 34.39 KG/M2 | HEART RATE: 78 BPM | DIASTOLIC BLOOD PRESSURE: 80 MMHG

## 2023-12-20 DIAGNOSIS — R00.2 PALPITATIONS: Primary | ICD-10-CM

## 2023-12-20 DIAGNOSIS — N18.31 STAGE 3A CHRONIC KIDNEY DISEASE: ICD-10-CM

## 2023-12-20 DIAGNOSIS — I15.9 SECONDARY HYPERTENSION: ICD-10-CM

## 2023-12-20 NOTE — PROGRESS NOTES
"    CARDIOLOGY        Patient Name: Kassy Ventura  :1950  Age: 73 y.o.  Primary Cardiologist: Caden Ashford MD  Encounter Provider:  Dany Murrell PA-C    Date of Service: 23        CHIEF COMPLAINT / REASON FOR OFFICE VISIT     Hospital follow-up    HISTORY OF PRESENT ILLNESS       HPI  Kassy Ventura is a 73 y.o. female with daughter who translates for me who presents today for 6-week hospital follow-up.  Patient has a history of CKD and hypertension presented to the ER for heart racing, pressure-like feeling in her chest, and urinary frequency.  Patient had a full workup admitted to the hospital.  Patient had serial troponins, EKG's, CT angiogram of chest, transthoracic echo, and discharged with a Holter monitor.  Patient also had thyroid studies.    Patient has been doing well since discharge from hospital.  He has not had any recurrence of chest pressure, shortness of breath, palpitations, lightheadedness, or edema to her legs.  Patient does have a chronic cough associated with postnasal drip.  No other new complaints.    The following portions of the patient's history were reviewed and updated as appropriate: allergies, current medications, past family history, past medical history, past social history, past surgical history and problem list.      VITAL SIGNS     Visit Vitals  /80 (BP Location: Left arm, Patient Position: Sitting, Cuff Size: Adult)   Pulse 78   Ht 149.9 cm (59\")   Wt 77.4 kg (170 lb 9.6 oz)   SpO2 98%   BMI 34.46 kg/m²         Wt Readings from Last 3 Encounters:   23 77.4 kg (170 lb 9.6 oz)   23 77.1 kg (170 lb)   10/05/23 78 kg (172 lb)     Body mass index is 34.46 kg/m².        PHYSICAL EXAMINATION     Constitutional:       General: Awake.      Appearance: Healthy appearance. Not in distress.   Pulmonary:      Effort: Pulmonary effort is normal.      Breath sounds: Normal breath sounds.   Cardiovascular:      Normal rate. Regular rhythm.   Pulses:    "  Intact distal pulses.   Edema:     Peripheral edema absent.   Skin:     General: Skin is warm.   Neurological:      Mental Status: Alert.   Psychiatric:         Behavior: Behavior is cooperative.           REVIEWED DATA       ECG 12 Lead    Date/Time: 12/20/2023 2:30 PM  Performed by: Dany Murrell PA-C    Authorized by: Dany Murrell PA-C  Comparison: compared with previous ECG   Similar to previous ECG  Rhythm: sinus rhythm  Rate: normal  QRS axis: normal  Other findings: low voltage  Comments: Low voltage in the precordial leads otherwise normal EKG          Cardiac Procedures:    Transthoracic echo on 11/17/2023  Interpretation Summary      Left ventricular systolic function is normal. Left ventricular ejection fraction appears to be 56 - 60%.    Left ventricular diastolic function was normal.    Holter monitor on 11/29/2023  Study Description      A normal monitor study.       Stress test on 9/8/2020  Interpretation Summary    Myocardial perfusion imaging indicates a normal myocardial perfusion study with no evidence of ischemia.  Left ventricular ejection fraction is hyperdynamic (Calculated EF > 70%).  Impressions are consistent with a low risk study.         Lab Results   Component Value Date     11/17/2023     03/02/2023    K 4.0 11/17/2023    K 4.3 03/02/2023     11/17/2023     03/02/2023    CO2 27.0 11/17/2023    CO2 27 03/02/2023    BUN 17 11/17/2023    BUN 18 03/02/2023    CREATININE 1.18 (H) 11/17/2023    CREATININE 1.19 (H) 03/02/2023    EGFRIFAFRI 54 (L) 03/02/2023    EGFRIFAFRI 60 (L) 12/18/2020    GLUCOSE 109 (H) 11/17/2023    CALCIUM 9.9 11/17/2023    CALCIUM 10.0 03/02/2023    ALBUMIN 4.5 11/17/2023    ALBUMIN 4.1 03/02/2023    BILITOT 0.2 11/17/2023    BILITOT 0.4 03/02/2023    AST 19 11/17/2023    AST 20 03/02/2023    ALT 19 11/17/2023    ALT 18 03/02/2023     Lab Results   Component Value Date    WBC 6.92 11/17/2023    WBC 7.75 03/02/2023    HGB 14.7  11/17/2023    HGB 13.2 03/02/2023    HCT 44.3 11/17/2023    HCT 39.8 03/02/2023    MCV 86.9 11/17/2023    MCV 86.9 03/02/2023     11/17/2023     03/02/2023     Lab Results   Component Value Date    PROBNP 42.5 11/17/2023     Lab Results   Component Value Date    TROPONINT 17 (H) 11/17/2023     Lab Results   Component Value Date    TSH 4.380 (H) 11/17/2023             ASSESSMENT & PLAN     Diagnoses and all orders for this visit:    1. Palpitations (Primary)   Recent extensive workup in the hospital.  Palpitations, chest pain, or shortness of breath.     2. Secondary hypertension   On Verapamil 80 mg tablets.  Patient's blood pressure mildly elevated today and told of lifestyle modifications along with home blood pressure readings.  Patient to follow-up with her PCP    3. Stage 3a chronic kidney disease   Creatinine 1.18 on hospitalization labs.  Followed by Dr. Ken    Follow-up with Dr. Ashford in 1 year or with any new symptoms.  Future Appointments         Provider Department Center    12/20/2024 10:30 AM Caden Ashford Jr., MD [facility] CARDIOLOGY [facility]                MEDICATIONS         Discharge Medications            Accurate as of December 20, 2023  2:44 PM. If you have any questions, ask your nurse or doctor.                Continue These Medications        Instructions Start Date   donepezil 10 MG tablet  Commonly known as: ARICEPT   10 mg, Nightly      fluticasone 50 MCG/ACT nasal spray  Commonly known as: FLONASE   2 sprays, Nasal, Daily      Linzess 145 MCG capsule capsule  Generic drug: linaclotide   1 capsule, Oral, Daily      omeprazole 20 MG capsule  Commonly known as: priLOSEC   20 mg, Oral, Every Night at Bedtime      oxybutynin XL 5 MG 24 hr tablet  Commonly known as: DITROPAN-XL   1 tablet, Oral, Daily      SUMAtriptan 25 MG tablet  Commonly known as: IMITREX   Take 1 tablet by mouth 1 (One) Time As Needed.      traZODone 50 MG tablet  Commonly known as:  DESYREL   50 mg, Oral, Nightly PRN      verapamil 80 MG tablet  Commonly known as: CALAN   1 tablet, Oral, Every 12 Hours Scheduled                   **Dragon Disclaimer:   Much of this encounter note is an electronic transcription/translation of spoken language to printed text. The electronic translation of spoken language may permit erroneous, or at times, nonsensical words or phrases to be inadvertently transcribed. Although I have reviewed the note for such errors, some may still exist.

## 2024-03-17 ENCOUNTER — APPOINTMENT (OUTPATIENT)
Dept: CT IMAGING | Facility: HOSPITAL | Age: 74
End: 2024-03-17
Payer: MEDICAID

## 2024-03-17 ENCOUNTER — HOSPITAL ENCOUNTER (EMERGENCY)
Facility: HOSPITAL | Age: 74
Discharge: HOME | End: 2024-03-17
Attending: STUDENT IN AN ORGANIZED HEALTH CARE EDUCATION/TRAINING PROGRAM
Payer: MEDICAID

## 2024-03-17 VITALS
RESPIRATION RATE: 16 BRPM | TEMPERATURE: 97.2 F | SYSTOLIC BLOOD PRESSURE: 153 MMHG | DIASTOLIC BLOOD PRESSURE: 88 MMHG | HEART RATE: 74 BPM | OXYGEN SATURATION: 94 %

## 2024-03-17 DIAGNOSIS — R79.89 ELEVATED SERUM CREATININE: ICD-10-CM

## 2024-03-17 DIAGNOSIS — R10.30 LOWER ABDOMINAL PAIN: ICD-10-CM

## 2024-03-17 DIAGNOSIS — N93.9 ABNORMAL UTERINE BLEEDING (AUB): ICD-10-CM

## 2024-03-17 DIAGNOSIS — N95.0 POSTMENOPAUSAL BLEEDING: Primary | ICD-10-CM

## 2024-03-17 DIAGNOSIS — R31.9 HEMATURIA, UNSPECIFIED TYPE: ICD-10-CM

## 2024-03-17 LAB
ALBUMIN SERPL-MCNC: 4.2 G/DL (ref 3.5–5.2)
ALBUMIN/GLOB SERPL: 1.4 G/DL
ALP SERPL-CCNC: 160 U/L (ref 39–117)
ALT SERPL W P-5'-P-CCNC: 21 U/L (ref 1–33)
ANION GAP SERPL CALCULATED.3IONS-SCNC: 13 MMOL/L (ref 5–15)
AST SERPL-CCNC: 19 U/L (ref 1–32)
BACTERIA UR QL AUTO: ABNORMAL /HPF
BASOPHILS # BLD AUTO: 0.03 10*3/MM3 (ref 0–0.2)
BASOPHILS NFR BLD AUTO: 0.4 % (ref 0–1.5)
BILIRUB SERPL-MCNC: 0.2 MG/DL (ref 0–1.2)
BILIRUB UR QL STRIP: NEGATIVE
BUN SERPL-MCNC: 20 MG/DL (ref 8–23)
BUN/CREAT SERPL: 15.9 (ref 7–25)
CALCIUM SPEC-SCNC: 9.9 MG/DL (ref 8.6–10.5)
CHLORIDE SERPL-SCNC: 103 MMOL/L (ref 98–107)
CLARITY UR: CLEAR
CO2 SERPL-SCNC: 26 MMOL/L (ref 22–29)
COLOR UR: ABNORMAL
CREAT SERPL-MCNC: 1.26 MG/DL (ref 0.57–1)
DEPRECATED RDW RBC AUTO: 46.1 FL (ref 37–54)
EGFRCR SERPLBLD CKD-EPI 2021: 45.2 ML/MIN/1.73
EOSINOPHIL # BLD AUTO: 0.11 10*3/MM3 (ref 0–0.4)
EOSINOPHIL NFR BLD AUTO: 1.3 % (ref 0.3–6.2)
ERYTHROCYTE [DISTWIDTH] IN BLOOD BY AUTOMATED COUNT: 14.4 % (ref 12.3–15.4)
GLOBULIN UR ELPH-MCNC: 2.9 GM/DL
GLUCOSE SERPL-MCNC: 105 MG/DL (ref 65–99)
GLUCOSE UR STRIP-MCNC: NEGATIVE MG/DL
HCT VFR BLD AUTO: 43.9 % (ref 34–46.6)
HGB BLD-MCNC: 14.4 G/DL (ref 12–15.9)
HGB UR QL STRIP.AUTO: ABNORMAL
HYALINE CASTS UR QL AUTO: ABNORMAL /LPF
IMM GRANULOCYTES # BLD AUTO: 0.02 10*3/MM3 (ref 0–0.05)
IMM GRANULOCYTES NFR BLD AUTO: 0.2 % (ref 0–0.5)
KETONES UR QL STRIP: NEGATIVE
LEUKOCYTE ESTERASE UR QL STRIP.AUTO: NEGATIVE
LYMPHOCYTES # BLD AUTO: 2.42 10*3/MM3 (ref 0.7–3.1)
LYMPHOCYTES NFR BLD AUTO: 29.7 % (ref 19.6–45.3)
MCH RBC QN AUTO: 29 PG (ref 26.6–33)
MCHC RBC AUTO-ENTMCNC: 32.8 G/DL (ref 31.5–35.7)
MCV RBC AUTO: 88.3 FL (ref 79–97)
MONOCYTES # BLD AUTO: 0.65 10*3/MM3 (ref 0.1–0.9)
MONOCYTES NFR BLD AUTO: 8 % (ref 5–12)
NEUTROPHILS NFR BLD AUTO: 4.93 10*3/MM3 (ref 1.7–7)
NEUTROPHILS NFR BLD AUTO: 60.4 % (ref 42.7–76)
NITRITE UR QL STRIP: NEGATIVE
NRBC BLD AUTO-RTO: 0 /100 WBC (ref 0–0.2)
PH UR STRIP.AUTO: 7.5 [PH] (ref 5–8)
PLATELET # BLD AUTO: 175 10*3/MM3 (ref 140–450)
PMV BLD AUTO: 9.6 FL (ref 6–12)
POTASSIUM SERPL-SCNC: 3.9 MMOL/L (ref 3.5–5.2)
PROT SERPL-MCNC: 7.1 G/DL (ref 6–8.5)
PROT UR QL STRIP: NEGATIVE
RBC # BLD AUTO: 4.97 10*6/MM3 (ref 3.77–5.28)
RBC # UR STRIP: ABNORMAL /HPF
REF LAB TEST METHOD: ABNORMAL
SODIUM SERPL-SCNC: 142 MMOL/L (ref 136–145)
SP GR UR STRIP: 1.01 (ref 1–1.03)
SQUAMOUS #/AREA URNS HPF: ABNORMAL /HPF
UROBILINOGEN UR QL STRIP: ABNORMAL
WBC # UR STRIP: ABNORMAL /HPF
WBC NRBC COR # BLD AUTO: 8.16 10*3/MM3 (ref 3.4–10.8)

## 2024-03-17 PROCEDURE — 36415 COLL VENOUS BLD VENIPUNCTURE: CPT

## 2024-03-17 PROCEDURE — 74177 CT ABD & PELVIS W/CONTRAST: CPT

## 2024-03-17 PROCEDURE — 81001 URINALYSIS AUTO W/SCOPE: CPT | Performed by: STUDENT IN AN ORGANIZED HEALTH CARE EDUCATION/TRAINING PROGRAM

## 2024-03-17 PROCEDURE — 85025 COMPLETE CBC W/AUTO DIFF WBC: CPT | Performed by: STUDENT IN AN ORGANIZED HEALTH CARE EDUCATION/TRAINING PROGRAM

## 2024-03-17 PROCEDURE — 80053 COMPREHEN METABOLIC PANEL: CPT | Performed by: STUDENT IN AN ORGANIZED HEALTH CARE EDUCATION/TRAINING PROGRAM

## 2024-03-17 PROCEDURE — 99285 EMERGENCY DEPT VISIT HI MDM: CPT

## 2024-03-17 PROCEDURE — 25510000001 IOPAMIDOL 61 % SOLUTION: Performed by: STUDENT IN AN ORGANIZED HEALTH CARE EDUCATION/TRAINING PROGRAM

## 2024-03-17 RX ADMIN — IOPAMIDOL 85 ML: 612 INJECTION, SOLUTION INTRAVENOUS at 20:22

## 2024-03-17 NOTE — ED PROVIDER NOTES
EMERGENCY DEPARTMENT ENCOUNTER    Room Number:  21/21  PCP: Deisi Vasquez MD  History obtained from: Patient      HPI:  Chief Complaint: Vaginal bleeding  A complete HPI/ROS/PMH/PSH/SH/FH are unobtainable due to: N/A  Context: Kassy Ventura is a 73 y.o. female who presents to the ED c/o vaginal bleeding.  Started noticing earlier today.  Noticed on paper when she went to wipe.  Also crampy lower abdominal pain.  No other recent illness, fever, chills.  No nausea or vomiting.            PAST MEDICAL HISTORY  Active Ambulatory Problems     Diagnosis Date Noted    Palpitations 11/17/2023     Resolved Ambulatory Problems     Diagnosis Date Noted    No Resolved Ambulatory Problems     Past Medical History:   Diagnosis Date    Hypertension          PAST SURGICAL HISTORY  No past surgical history on file.      FAMILY HISTORY  Family History   Problem Relation Age of Onset    Heart failure Mother     Cancer Father     No Known Problems Sister     No Known Problems Brother     No Known Problems Son     No Known Problems Daughter     No Known Problems Maternal Grandmother     No Known Problems Maternal Grandfather     No Known Problems Paternal Grandmother     No Known Problems Paternal Grandfather     No Known Problems Cousin     No Known Problems Other          SOCIAL HISTORY  Social History     Socioeconomic History    Marital status: Single   Tobacco Use    Smoking status: Former     Types: Cigarettes    Smokeless tobacco: Never   Vaping Use    Vaping status: Never Used   Substance and Sexual Activity    Alcohol use: Never    Drug use: Never    Sexual activity: Defer         ALLERGIES  Penicillins        REVIEW OF SYSTEMS    As per HPI      PHYSICAL EXAM  ED Triage Vitals [03/17/24 1548]   Temp Heart Rate Resp BP SpO2   97.2 °F (36.2 °C) 81 16 -- 96 %      Temp src Heart Rate Source Patient Position BP Location FiO2 (%)   -- -- -- -- --       Physical Exam  Constitutional:       General: She is not in acute  distress.  HENT:      Head: Normocephalic and atraumatic.   Cardiovascular:      Rate and Rhythm: Normal rate and regular rhythm.   Pulmonary:      Effort: Pulmonary effort is normal. No respiratory distress.   Abdominal:      General: There is no distension.      Palpations: Abdomen is soft.      Tenderness: There is no abdominal tenderness.   Genitourinary:     Comments: External perineal exam demonstrated no evidence of active bleeding or lesion  Musculoskeletal:         General: No swelling or deformity.   Skin:     General: Skin is warm and dry.   Neurological:      Mental Status: She is alert. Mental status is at baseline.           Vital signs and nursing notes reviewed.          LAB RESULTS  Recent Results (from the past 24 hour(s))   Comprehensive Metabolic Panel    Collection Time: 03/17/24  4:53 PM    Specimen: Blood   Result Value Ref Range    Glucose 105 (H) 65 - 99 mg/dL    BUN 20 8 - 23 mg/dL    Creatinine 1.26 (H) 0.57 - 1.00 mg/dL    Sodium 142 136 - 145 mmol/L    Potassium 3.9 3.5 - 5.2 mmol/L    Chloride 103 98 - 107 mmol/L    CO2 26.0 22.0 - 29.0 mmol/L    Calcium 9.9 8.6 - 10.5 mg/dL    Total Protein 7.1 6.0 - 8.5 g/dL    Albumin 4.2 3.5 - 5.2 g/dL    ALT (SGPT) 21 1 - 33 U/L    AST (SGOT) 19 1 - 32 U/L    Alkaline Phosphatase 160 (H) 39 - 117 U/L    Total Bilirubin 0.2 0.0 - 1.2 mg/dL    Globulin 2.9 gm/dL    A/G Ratio 1.4 g/dL    BUN/Creatinine Ratio 15.9 7.0 - 25.0    Anion Gap 13.0 5.0 - 15.0 mmol/L    eGFR 45.2 (L) >60.0 mL/min/1.73   CBC Auto Differential    Collection Time: 03/17/24  4:53 PM    Specimen: Blood   Result Value Ref Range    WBC 8.16 3.40 - 10.80 10*3/mm3    RBC 4.97 3.77 - 5.28 10*6/mm3    Hemoglobin 14.4 12.0 - 15.9 g/dL    Hematocrit 43.9 34.0 - 46.6 %    MCV 88.3 79.0 - 97.0 fL    MCH 29.0 26.6 - 33.0 pg    MCHC 32.8 31.5 - 35.7 g/dL    RDW 14.4 12.3 - 15.4 %    RDW-SD 46.1 37.0 - 54.0 fl    MPV 9.6 6.0 - 12.0 fL    Platelets 175 140 - 450 10*3/mm3    Neutrophil % 60.4  42.7 - 76.0 %    Lymphocyte % 29.7 19.6 - 45.3 %    Monocyte % 8.0 5.0 - 12.0 %    Eosinophil % 1.3 0.3 - 6.2 %    Basophil % 0.4 0.0 - 1.5 %    Immature Grans % 0.2 0.0 - 0.5 %    Neutrophils, Absolute 4.93 1.70 - 7.00 10*3/mm3    Lymphocytes, Absolute 2.42 0.70 - 3.10 10*3/mm3    Monocytes, Absolute 0.65 0.10 - 0.90 10*3/mm3    Eosinophils, Absolute 0.11 0.00 - 0.40 10*3/mm3    Basophils, Absolute 0.03 0.00 - 0.20 10*3/mm3    Immature Grans, Absolute 0.02 0.00 - 0.05 10*3/mm3    nRBC 0.0 0.0 - 0.2 /100 WBC   Urinalysis With Microscopic If Indicated (No Culture) - Urine, Clean Catch    Collection Time: 03/17/24  4:54 PM    Specimen: Urine, Clean Catch   Result Value Ref Range    Color, UA Straw Yellow, Straw    Appearance, UA Clear Clear    pH, UA 7.5 5.0 - 8.0    Specific Gravity, UA 1.010 1.005 - 1.030    Glucose, UA Negative Negative    Ketones, UA Negative Negative    Bilirubin, UA Negative Negative    Blood, UA Moderate (2+) (A) Negative    Protein, UA Negative Negative    Leuk Esterase, UA Negative Negative    Nitrite, UA Negative Negative    Urobilinogen, UA 0.2 E.U./dL 0.2 - 1.0 E.U./dL       Ordered the above labs and reviewed the results.        RADIOLOGY  No Radiology Exams Resulted Within Past 24 Hours    Ordered the above noted radiological studies. Reviewed by me in PACS.                  MEDICATIONS GIVEN IN ER  Medications - No data to display            MEDICAL DECISION MAKING, PROGRESS, and CONSULTS    MDM: Patient presented emergency department with suspected postmenopausal vaginal bleeding, otherwise well-appearing, vitals otherwise stable.  Also with nonspecific abdominal pain.  Labs otherwise reassuring.  Plan for CT to evaluate for gross structural pathology to explain her bleeding.  Patient will need to be discharged with GYN follow-up for additional workup and management.    All labs have been independently reviewed by me.  All radiology studies have been reviewed by me and I have also  reviewed the radiology report.   EKG's independently viewed and interpreted by me.  Discussion below represents my analysis of pertinent findings related to patient's condition, differential diagnosis, treatment plan and final disposition.      Additional sources:  - Discussed/ obtained information from independent historians: Daughter    - External (non-ED) record review:     - Chronic or social conditions impacting care: Language barrier    - Shared decision making:        Orders placed during this visit:  Orders Placed This Encounter   Procedures    CT Abdomen Pelvis With Contrast    Comprehensive Metabolic Panel    Urinalysis With Microscopic If Indicated (No Culture) - Urine, Clean Catch    CBC Auto Differential    Urinalysis, Microscopic Only - Urine, Clean Catch    CBC & Differential         Additional orders considered but not ordered:  Considered pelvic ultrasound however will obtain more information on CT        Differential diagnosis includes but is not limited to:    Endometrial hyperplasia, fibroids, cervical mass/cancer      Independent interpretation of labs, radiology studies, and discussions with consultants:  ED Course as of 03/22/24 2038   Sun Mar 17, 2024   1821 Blood, UA(!): Moderate (2+) [FS]   1821 Creatinine(!): 1.26 [FS]   1900 Transfer of care from Dr Bañuelos.  Discussed the patient, relevant history, exam, diagnostics, ED findings/progress. Pending CT imaging and disposition.     [JG]   2159 CT imaging unremarkable other than minimal perivascular stranding.  Urinalysis shows no bacteria, no sign of urinary tract infection, patient does have trace white blood cells in urine.  ED workup otherwise unremarkable other than creatinine mildly elevated at 1.26, similar to prior.  Patient is well-appearing appears appropriate for discharge with outpatient follow-up. [JG]   2151 Patient reassessed, discussed ED workup and results.  Discussed need for close follow-up with primary care, discussed  referral to OB/GYN.  Given extensive discussion return precautions, discharging. [JG]      ED Course User Index  [FS] Pramod Bañuelos MD  [JG] Sergio Briones MD           DIAGNOSIS  Final diagnoses:   Abnormal uterine bleeding (AUB)   Lower abdominal pain   Hematuria, unspecified type   Elevated serum creatinine   Postmenopausal bleeding         DISPOSITION  Discharged home        Latest Documented Vital Signs:  As of 19:12 EDT  BP-   HR- 81 Temp- 97.2 °F (36.2 °C) O2 sat- 96%              --    Please note that portions of this were completed with a voice recognition program.       Note Disclaimer: At [facility], we believe that sharing information builds trust and better relationships. You are receiving this note because you are receiving care at [facility] or recently visited. It is possible you will see health information before a provider has talked with you about it. This kind of information can be easy to misunderstand. To help you fully understand what it means for your health, we urge you to discuss this note with your provider.             Pramod Bañuelos MD  03/22/24 2039

## 2024-03-17 NOTE — ED TRIAGE NOTES
Pt/family reports vaginal bleeding that started just pta, reports noting blood when she wiped, also reports lower abd pain

## 2024-03-18 ENCOUNTER — TELEPHONE (OUTPATIENT)
Dept: OBSTETRICS AND GYNECOLOGY | Age: 74
End: 2024-03-18
Payer: MEDICAID

## 2024-03-18 NOTE — TELEPHONE ENCOUNTER
S/w pt, seen at [facility] 3/17 and referred to Gyn.  Made appt for 4/10 with Dr. Quezada.  Pt to see PCP today and will try to get in w/ [facility] sooner than 4/10.      Asked them to call back if she wants to keep appt here, or we can ask for approval to get her worked in sooner than 4/10 if needed.

## 2024-04-12 ENCOUNTER — TELEPHONE (OUTPATIENT)
Dept: OBSTETRICS AND GYNECOLOGY | Age: 74
End: 2024-04-12
Payer: MEDICAID

## 2024-04-12 NOTE — TELEPHONE ENCOUNTER
Patient calling to reschedule [facility] - new gyn appointment , please adviser where to schedule   Call her [place] family member to translate @ [phone]